# Patient Record
(demographics unavailable — no encounter records)

---

## 2020-03-04 NOTE — BD
EXAM: Bone densitometry using DEXA



HISTORY:

64 yo female. Screening for postmenopausal osteoporosis



FINDINGS:

L1--bone mineral density 0.896 g/sq cm; T score -0.9 ; Z score 0.8



L2--bone mineral density 0.972 g/sq cm; T score -0.5 ; Z score 1.3



L3--bone mineral density 0.890 g/sq cm; T score -1.8 ; Z score 0.1



L4--bone mineral density 0.888 g/sq cm; T score -1.6 ; Z score 0.4



Total L1-L4--bone mineral density 0.912 g/sq cm; T score -1.2 ; Z score 0.6



Left femoral neck--bone mineral density0.598; T score -2.3 ; Z score -0.7



Total proximal left femur--bone mineral density 0.896; T score -0.4 ; Z score 0.9



The 10 year fracture risk for a major osteoporotic fracture is 11% and for a hip fracture is 2%.



IMPRESSION: Osteopenia 



Reported By: James Santiago 

Electronically Signed:  3/4/2020 1:39 PM

## 2020-05-21 NOTE — OP
DATE OF PROCEDURE:  05/21/2020



PREOPERATIVE DIAGNOSIS:  Thickened endometrium.



POSTOPERATIVE DIAGNOSIS:  Thickened endometrium.



PROCEDURES:  Hysteroscopy, D and C, TruClear resection of endometrial mass.



ANESTHESIA:  General LMA.



ASSISTANT SURGEON:  None.



COMPLICATIONS:  None.



DRAINS:  None.



PATHOLOGY:  

1. Anterior endometrial mass.

2. Endometrial curettings.



FINDINGS:  Exam under anesthesia and normal postmenopausal uterus sounded to 8 cm.

Cervix is normal-appearing.  Endocervical canal is normal appearing.  The

endometrium was atrophic with the exception of a mass of the anterior fundal portion

of the uterus.  Bilateral tubal ostia were visualized and the fluid deficit for the

case was 460 mL. 



A small superficial approximately 2 cm long laceration/skin separation, most likely

due from traction with either prep or instrumentation.  Bacitracin was placed over

this and it was hemostatic.  This was just to the right of the clitoris. 



DESCRIPTION OF PROCEDURE:  The patient was taken to the operating room, where

general anesthesia was obtained without difficulty.  The patient was prepped and

draped in a sterile fashion in the dorsal lithotomy position.  A red rubber was used

to drain the bladder.  The speculum was placed in the vagina.  The anterior lip of

the cervix was grasped with single-tooth tenaculum.  The cervix was progressively

dilated with Isaiah dilators and sounded to 8 cm.  The 5-mm hysteroscope was assembled

and primed and inserted into the uterus using normal saline as distention medium.

The above findings were noted and photodocumentation was performed.  A mini soft

tissue incisor was then used to resect the mass and send it separately for

pathology.  The mass was easily resected.  Following that, the hysteroscope was

removed and sharp curettage was taken to all uterine walls.  The tenaculum was

removed off the cervix.  Hemostasis was noted to be excellent.  All instruments were

removed out of the vagina.  The patient tolerated the procedure well.  Sponge and

needle counts correct x2.  The patient was taken to recovery room in stable

condition.  Blood loss for the case was 5 mL. 







Job ID:  [id number]